# Patient Record
Sex: MALE
[De-identification: names, ages, dates, MRNs, and addresses within clinical notes are randomized per-mention and may not be internally consistent; named-entity substitution may affect disease eponyms.]

---

## 2020-08-14 PROBLEM — Z00.00 ENCOUNTER FOR PREVENTIVE HEALTH EXAMINATION: Status: ACTIVE | Noted: 2020-08-14

## 2021-09-23 ENCOUNTER — APPOINTMENT (OUTPATIENT)
Dept: OTOLARYNGOLOGY | Facility: CLINIC | Age: 51
End: 2021-09-23
Payer: COMMERCIAL

## 2021-09-23 VITALS
SYSTOLIC BLOOD PRESSURE: 115 MMHG | WEIGHT: 160 LBS | HEART RATE: 80 BPM | DIASTOLIC BLOOD PRESSURE: 73 MMHG | BODY MASS INDEX: 22.9 KG/M2 | HEIGHT: 70 IN

## 2021-09-23 DIAGNOSIS — J34.89 OTHER SPECIFIED DISORDERS OF NOSE AND NASAL SINUSES: ICD-10-CM

## 2021-09-23 DIAGNOSIS — Z83.3 FAMILY HISTORY OF DIABETES MELLITUS: ICD-10-CM

## 2021-09-23 DIAGNOSIS — J34.3 HYPERTROPHY OF NASAL TURBINATES: ICD-10-CM

## 2021-09-23 PROCEDURE — 31231 NASAL ENDOSCOPY DX: CPT

## 2021-09-23 PROCEDURE — 99204 OFFICE O/P NEW MOD 45 MIN: CPT | Mod: 25

## 2021-09-23 NOTE — PHYSICAL EXAM
[Nasal Endoscopy Performed] : nasal endoscopy was performed, see procedure section for findings [de-identified] : no skin changes, no palpable fracture [Midline] : trachea located in midline position [Normal] : no rashes

## 2021-09-23 NOTE — HISTORY OF PRESENT ILLNESS
[de-identified] : 51 year old male presents for nasal pain\par Started 1 wk ago-- was localized to nasal bridge, bilateral maxillary areas\par Denies known nasal trauma\par No hx of recurrent sinus infections\par Had no associated sinonasal symptoms\par Denies nasal congestion, anterior rhinorrhea, epistaxis, PND\par Sense of smell is good\par Last sinus infection about 2 years ago\par Denies use of nasal sprays

## 2022-11-21 ENCOUNTER — OFFICE (OUTPATIENT)
Dept: URBAN - METROPOLITAN AREA CLINIC 35 | Facility: CLINIC | Age: 52
Setting detail: OPHTHALMOLOGY
End: 2022-11-21
Payer: COMMERCIAL

## 2022-11-21 DIAGNOSIS — H25.013: ICD-10-CM

## 2022-11-21 DIAGNOSIS — H11.151: ICD-10-CM

## 2022-11-21 DIAGNOSIS — H01.004: ICD-10-CM

## 2022-11-21 DIAGNOSIS — H40.013: ICD-10-CM

## 2022-11-21 DIAGNOSIS — H11.042: ICD-10-CM

## 2022-11-21 DIAGNOSIS — H02.89: ICD-10-CM

## 2022-11-21 DIAGNOSIS — H01.001: ICD-10-CM

## 2022-11-21 PROCEDURE — 92083 EXTENDED VISUAL FIELD XM: CPT | Performed by: OPHTHALMOLOGY

## 2022-11-21 PROCEDURE — 92014 COMPRE OPH EXAM EST PT 1/>: CPT | Performed by: OPHTHALMOLOGY

## 2022-11-21 PROCEDURE — 76514 ECHO EXAM OF EYE THICKNESS: CPT | Performed by: OPHTHALMOLOGY

## 2022-11-21 PROCEDURE — 92133 CPTRZD OPH DX IMG PST SGM ON: CPT | Performed by: OPHTHALMOLOGY

## 2022-11-21 ASSESSMENT — CONFRONTATIONAL VISUAL FIELD TEST (CVF)
OS_FINDINGS: FULL
OD_FINDINGS: FULL

## 2022-11-21 ASSESSMENT — PACHYMETRY
OS_CT_UM: 531
OD_CT_CORRECTION: 1
OS_CT_CORRECTION: 1
OD_CT_UM: 528

## 2022-11-21 ASSESSMENT — REFRACTION_AUTOREFRACTION
OS_CYLINDER: -0.50
OS_AXIS: 079
OD_CYLINDER: -0.25
OD_SPHERE: -0.75
OS_SPHERE: -0.75
OD_AXIS: 094

## 2022-11-21 ASSESSMENT — VISUAL ACUITY
OD_BCVA: 20/20
OS_BCVA: 20/20

## 2022-11-21 ASSESSMENT — SPHEQUIV_DERIVED
OD_SPHEQUIV: -1.125
OD_SPHEQUIV: -0.875
OS_SPHEQUIV: -1.125
OS_SPHEQUIV: -1

## 2022-11-21 ASSESSMENT — REFRACTION_MANIFEST
OS_SPHERE: -1.00
OS_AXIS: 165
OD_CYLINDER: +0.25
OS_CYLINDER: +0.25
OS_CYLINDER: SPHERE
OD_VA1: 20/20
OD_CYLINDER: SPHERE
OD_SPHERE: -1.00
OS_SPHERE: -1.25
OD_AXIS: 004
OD_SPHERE: -1.25
OS_VA1: 20/20

## 2022-11-21 ASSESSMENT — AXIALLENGTH_DERIVED
OS_AL: 25.1873
OS_AL: 25.1319
OD_AL: 24.9797
OD_AL: 24.8709

## 2022-11-21 ASSESSMENT — KERATOMETRY
OS_K2POWER_DIOPTERS: 40.75
OS_K1POWER_DIOPTERS: 40.50
OD_AXISANGLE_DEGREES: 095
OS_AXISANGLE_DEGREES: 076
OD_K1POWER_DIOPTERS: 41.00
OD_K2POWER_DIOPTERS: 41.25

## 2022-11-21 ASSESSMENT — REFRACTION_CURRENTRX
OS_VPRISM_DIRECTION: SV
OS_OVR_VA: 20/
OD_OVR_VA: 20/
OD_SPHERE: -1.00
OS_SPHERE: -1.00
OD_VPRISM_DIRECTION: SV

## 2022-11-21 ASSESSMENT — LID EXAM ASSESSMENTS
OS_MEIBOMITIS: LLL 1+
OD_MEIBOMITIS: RLL 1+
OS_BLEPHARITIS: LUL 1+
OD_BLEPHARITIS: RUL 1+

## 2022-11-21 ASSESSMENT — CORNEAL PTERYGIUM: OS_PTERYGIUM: NASAL 2MM

## 2022-11-21 ASSESSMENT — TONOMETRY
OS_IOP_MMHG: 18
OD_IOP_MMHG: 18

## 2023-03-16 ENCOUNTER — APPOINTMENT (OUTPATIENT)
Dept: ORTHOPEDIC SURGERY | Facility: CLINIC | Age: 53
End: 2023-03-16
Payer: COMMERCIAL

## 2023-03-16 VITALS — HEIGHT: 70 IN | WEIGHT: 155 LBS | BODY MASS INDEX: 22.19 KG/M2

## 2023-03-16 DIAGNOSIS — M79.18 MYALGIA, OTHER SITE: ICD-10-CM

## 2023-03-16 DIAGNOSIS — Z78.9 OTHER SPECIFIED HEALTH STATUS: ICD-10-CM

## 2023-03-16 PROCEDURE — 99204 OFFICE O/P NEW MOD 45 MIN: CPT

## 2023-03-16 PROCEDURE — 73010 X-RAY EXAM OF SHOULDER BLADE: CPT | Mod: RT

## 2023-03-16 PROCEDURE — 73030 X-RAY EXAM OF SHOULDER: CPT | Mod: RT

## 2023-03-16 RX ORDER — METHYLPREDNISOLONE 4 MG/1
4 TABLET ORAL
Qty: 1 | Refills: 0 | Status: ACTIVE | COMMUNITY
Start: 2023-03-16 | End: 1900-01-01

## 2023-03-16 NOTE — IMAGING
[de-identified] : \par RIGHT SHOULDER\par Inspection: No swelling. \par Palpation: Tenderness is noted at the bicipital groove, anterior and lateral. \par Range of motion: There is pain with range of motion.\par , ER 55, @90ER 90, @90IR 30\par Strength: There is pain and discomfort with strength testing.\par Forward Flexion 4/5. Abduction 4/5.  External Rotation 5-/5 and Internal Rotation 5/5 \par Neurological testings: motor and sensor intact distally.\par Ligament Stability and Special Tests: \par There is positive arc of pain. \par Shoulder apprehension: neg\par Shoulder relocation: neg\par Hicks’s test: pos\par Biceps Active test: neg\par Jamison Labral Shear: neg\par Impingement testing: pos\par Orion testing: pos\par Whipple: pos\par Cross Body Adduction: neg\par \par

## 2023-03-16 NOTE — HISTORY OF PRESENT ILLNESS
[de-identified] : 52 year old male  (RHD, wealth management  )  right shoulder pain since 2/2023 after working out\par The pain is located  anterior, lateral \par The pain is associated with  clicking, weakness\par Worse with activity and better at rest.\par Has tried ice, Aleve \par

## 2023-03-16 NOTE — ASSESSMENT
[FreeTextEntry1] : Right X-Ray Examination of the SHOULDER (2 views):  No fractures, subluxations or dislocations. \par X-Ray Examination of the SCAPULA 1 or 2 views shows: No significant abnormalities.  Acromial spur. \par  \par - We discussed their diagnosis and treatment options at length including the risks and benefits of both surgical and non-surgical options.\par - We will continue conservative treatment with a course of PT, icing, and anti-inflammatory medication.\par - The patient was provided with a prescription to work on scapular strengthening and rotator cuff strengthening.\par - The patient was advised to let pain guide the gradual advancement of activities.\par - MDP \par - Discussed the possible side effects of medication along with the timing and frequency for taking.\par - Follow up as needed in 6 weeks to re-evaluate progress with therapy\par

## 2023-08-03 ENCOUNTER — APPOINTMENT (OUTPATIENT)
Dept: ORTHOPEDIC SURGERY | Facility: CLINIC | Age: 53
End: 2023-08-03
Payer: COMMERCIAL

## 2023-08-03 DIAGNOSIS — S43.001A UNSPECIFIED SUBLUXATION OF RIGHT SHOULDER JOINT, INITIAL ENCOUNTER: ICD-10-CM

## 2023-08-03 DIAGNOSIS — G25.89 OTHER SPECIFIED EXTRAPYRAMIDAL AND MOVEMENT DISORDERS: ICD-10-CM

## 2023-08-03 PROCEDURE — 99213 OFFICE O/P EST LOW 20 MIN: CPT

## 2023-08-03 RX ORDER — NAPROXEN 500 MG/1
500 TABLET ORAL TWICE DAILY
Qty: 60 | Refills: 0 | Status: ACTIVE | COMMUNITY
Start: 2023-08-03 | End: 1900-01-01

## 2023-08-03 NOTE — HISTORY OF PRESENT ILLNESS
[de-identified] : 52 year old male  (RHD, wealth management  )  right shoulder pain since 2/2023 after working out The pain is located  anterior, lateral  The pain is associated with  clicking, weakness Worse with activity and better at rest. Has tried ice, Aleve   8/3/23- doing PT , took MDP with temp rleief but s1till pain with OH activtiy

## 2023-08-03 NOTE — ASSESSMENT
[FreeTextEntry1] :  We will obtain an MRI to evaluate the integrity of the rotator cuff tissue and possible need for surgery, given their continued pain, weakness on exam, positive rey testing, and failure to improve with over 6 weeks of conservative treatment.  - The patient was advised of the diagnosis.  The natural history of the pathology was explained to the patient in layman's terms.  Several different treatment options were discussed and explained including the risks and benefits of both surgical and non-surgical treatments. - We will continue conservative treatment with PT, icing, and anti-inflammatory medication. - Naprosyn rx - Patient was given a prescription for an anti-inflammatory medication.  They will take it for the next week and then on an as needed basis, as long as there are no medical contra-indications.  Patient is counseled on possible GI, renal, and cardiovascular side effects. - Follow up after MRI to discuss results and further discuss treatment options. - In the meantime, the patient was advised to apply ice to the area daily, use anti-inflammatory medication, and let pain guide their activities.

## 2023-08-03 NOTE — IMAGING
[de-identified] : RIGHT SHOULDER Inspection: No swelling.  Palpation: Tenderness is noted at the bicipital groove, anterior and lateral.  Range of motion: There is pain with range of motion. , ER 55, @90ER 90, @90IR 30 Strength: There is pain and discomfort with strength testing. Forward Flexion 4/5. Abduction 4/5.  External Rotation 5-/5 and Internal Rotation 5/5  Neurological testings: motor and sensor intact distally. Ligament Stability and Special Tests:  There is positive arc of pain.  Shoulder apprehension: neg Shoulder relocation: neg Hicks's test: pos Biceps Active test: neg Jamison Labral Shear: neg Impingement testing: pos Orion testing: pos Whipple: pos Cross Body Adduction: neg

## 2023-08-12 ENCOUNTER — APPOINTMENT (OUTPATIENT)
Dept: MRI IMAGING | Facility: CLINIC | Age: 53
End: 2023-08-12
Payer: COMMERCIAL

## 2023-08-12 PROCEDURE — 73221 MRI JOINT UPR EXTREM W/O DYE: CPT | Mod: RT

## 2023-08-24 ENCOUNTER — APPOINTMENT (OUTPATIENT)
Dept: ORTHOPEDIC SURGERY | Facility: CLINIC | Age: 53
End: 2023-08-24
Payer: COMMERCIAL

## 2023-08-24 DIAGNOSIS — M75.111 INCOMPLETE ROTATOR CUFF TEAR OR RUPTURE OF RIGHT SHOULDER, NOT SPECIFIED AS TRAUMATIC: ICD-10-CM

## 2023-08-24 DIAGNOSIS — M75.51 BURSITIS OF RIGHT SHOULDER: ICD-10-CM

## 2023-08-24 DIAGNOSIS — M75.41 IMPINGEMENT SYNDROME OF RIGHT SHOULDER: ICD-10-CM

## 2023-08-24 DIAGNOSIS — M75.21 BICIPITAL TENDINITIS, RIGHT SHOULDER: ICD-10-CM

## 2023-08-24 DIAGNOSIS — S43.431A SUPERIOR GLENOID LABRUM LESION OF RIGHT SHOULDER, INITIAL ENCOUNTER: ICD-10-CM

## 2023-08-24 DIAGNOSIS — M71.319 OTHER BURSAL CYST, UNSPECIFIED SHOULDER: ICD-10-CM

## 2023-08-24 PROCEDURE — 99214 OFFICE O/P EST MOD 30 MIN: CPT

## 2023-08-24 NOTE — DISCUSSION/SUMMARY
[de-identified] : The patient was advised of the diagnosis. The natural history of the pathology was explained in full to the patient in layman's terms. Several different treatment options were discussed and explained to the patient including the risks and benefits of both surgical and non-surgical treatments.   I do think they are a candidate for surgery, given their pain, weakness, and failure to improve with previous non-operative treatment. We discussed that the goal of surgery is pain relief along with improved function.   The risks, benefits, and alternatives to surgical arthroscopy of the right shoulder with rotator cuff repair vs debridement, subacromial decompression, glenohumeral joint debridement, possible labral repair cyst ecsiion, and possible biceps tenotomy vs. tenodesis were reviewed with the patient. Risks of surgery were outlined in full to the patient including but not limited to pain, infection (superficial or deep), bleeding, vascular injury, numbness, tingling, nerve damage (direct or indirect), scarring, stiffness  (including the possible development of post op adhesive capsulitis), non-healing, wound breakdown, failure to resolve symptoms, symptom recurrence, the need for further surgery, as well as medical complications such as blood clots, pulmonary embolism, heart attack, stroke, and other anesthesia complications including even death. The patient understood and accepted these risks and that other complications not mentioned above could occur.   They understand that 100% recovery is not assured and the desired level of function may not be achievable. We discussed the potential for a prolonged recovery course and the potential for this to affect their activities.  The intraoperative plan, post-operative plan, post-operative expectations and limitations were explained in full. The importance of postoperative rehabilitation and restriction compliance was emphasized. Expectations from non-surgical treatment were explained in full as well. The patient demonstrated a complete understanding of the treatment alternatives and requested to proceed with surgery. This will be scheduled accordingly.

## 2023-08-24 NOTE — IMAGING
[de-identified] : RIGHT SHOULDER Inspection: No swelling.  Palpation: Tenderness is noted at the bicipital groove, anterior and lateral.  Range of motion: There is pain with range of motion. , ER 55, @90ER 90, @90IR 30 Strength: There is pain and discomfort with strength testing. Forward Flexion 4/5. Abduction 4/5.  External Rotation 5-/5 and Internal Rotation 5/5  Neurological testings: motor and sensor intact distally. Ligament Stability and Special Tests:  There is positive arc of pain.  Shoulder apprehension: neg Shoulder relocation: neg Hicks's test: pos Biceps Active test: pos Jamison Labral Shear: pos Impingement testing: pos Orion testing: pos Whipple: pos Cross Body Adduction: neg

## 2023-08-24 NOTE — HISTORY OF PRESENT ILLNESS
[de-identified] : 52 year old male  (RHD, wealth management  )  right shoulder pain since 2/2023 after working out The pain is located  anterior, lateral  The pain is associated with  clicking, weakness Worse with activity and better at rest. Has tried ice, Aleve   8/3/23- doing PT , took MDP with temp rleief but s1till pain with OH activtiy 8/24/23 - cont pain and weakness, had mri showing RTC tearing and labral tearing with cyst

## 2023-08-24 NOTE — ASSESSMENT
[FreeTextEntry1] : mri right shoulder 8/12/23 - partial tear supra and subscap, post-sup labral tearing, post-sup paralabral cyst with ext to notch, bursitis, bicep tendonitis  - We discussed their diagnosis and treatment options at length including the risks and benefits of both surgical and non-surgical options. - Given the fact that they continue to have pain, weakness, and have failed conservative treatment including icing, anti-inflammatory medications, injection, and PT, they are a surgical candidate. - The risks, benefits, and alternatives to right /shoulder surgical arthroscopy with rotator cuff repair vs debridement, SAD, GHD, possible labral repair, cyst excsiion, and poss biceps tenotomy vs. tenodesis were discussed with the patient, all questions were answered.

## 2023-10-05 ENCOUNTER — OFFICE (OUTPATIENT)
Dept: URBAN - METROPOLITAN AREA CLINIC 35 | Facility: CLINIC | Age: 53
Setting detail: OPHTHALMOLOGY
End: 2023-10-05
Payer: COMMERCIAL

## 2023-10-05 DIAGNOSIS — H52.13: ICD-10-CM

## 2023-10-05 DIAGNOSIS — H11.042: ICD-10-CM

## 2023-10-05 DIAGNOSIS — H40.013: ICD-10-CM

## 2023-10-05 PROCEDURE — 92015 DETERMINE REFRACTIVE STATE: CPT | Performed by: OPHTHALMOLOGY

## 2023-10-05 PROCEDURE — 99213 OFFICE O/P EST LOW 20 MIN: CPT | Performed by: OPHTHALMOLOGY

## 2023-10-05 ASSESSMENT — REFRACTION_MANIFEST
OD_SPHERE: -1.00
OS_SPHERE: -1.00
OS_CYLINDER: +0.25
OD_CYLINDER: +0.25
OD_CYLINDER: -0.25
OS_AXIS: 165
OD_SPHERE: -0.75
OD_VA1: 20/20
OD_AXIS: 004
OS_AXIS: 088
OD_VA1: 20/15
OS_VA1: 20/20
OS_SPHERE: -1.25
OD_AXIS: 085
OD_CYLINDER: SPHERE
OD_SPHERE: -1.25
OS_SPHERE: -0.75
OS_CYLINDER: SPHERE
OS_VA1: 20/15
OS_CYLINDER: -0.25

## 2023-10-05 ASSESSMENT — REFRACTION_CURRENTRX
OD_OVR_VA: 20/
OS_VPRISM_DIRECTION: SV
OD_VPRISM_DIRECTION: SV
OS_OVR_VA: 20/
OD_SPHERE: -1.00
OS_SPHERE: -1.00

## 2023-10-05 ASSESSMENT — AXIALLENGTH_DERIVED
OS_AL: 25.0767
OS_AL: 25.1319
OD_AL: 24.8709
OD_AL: 24.8709
OD_AL: 24.9797
OS_AL: 25.1873

## 2023-10-05 ASSESSMENT — TONOMETRY
OD_IOP_MMHG: 17
OS_IOP_MMHG: 17

## 2023-10-05 ASSESSMENT — KERATOMETRY
OS_K1POWER_DIOPTERS: 40.50
OD_K1POWER_DIOPTERS: 41.00
OD_AXISANGLE_DEGREES: 095
OD_K2POWER_DIOPTERS: 41.25
OS_K2POWER_DIOPTERS: 40.75
OS_AXISANGLE_DEGREES: 076

## 2023-10-05 ASSESSMENT — PACHYMETRY
OD_CT_UM: 528
OD_CT_CORRECTION: 1
OS_CT_UM: 531
OS_CT_CORRECTION: 1

## 2023-10-05 ASSESSMENT — SPHEQUIV_DERIVED
OD_SPHEQUIV: -0.875
OS_SPHEQUIV: -1
OD_SPHEQUIV: -1.125
OS_SPHEQUIV: -1.125
OD_SPHEQUIV: -0.875
OS_SPHEQUIV: -0.875

## 2023-10-05 ASSESSMENT — REFRACTION_AUTOREFRACTION
OD_AXIS: 083
OD_SPHERE: -0.75
OS_SPHERE: -0.75
OS_AXIS: 088
OD_CYLINDER: -0.25
OS_CYLINDER: -0.50

## 2023-10-05 ASSESSMENT — LID EXAM ASSESSMENTS
OS_BLEPHARITIS: LUL 1+
OD_BLEPHARITIS: RUL 1+
OD_MEIBOMITIS: RLL 1+
OS_MEIBOMITIS: LLL 1+

## 2023-10-05 ASSESSMENT — VISUAL ACUITY
OD_BCVA: 20/40+2
OS_BCVA: 20/25-2

## 2023-10-05 ASSESSMENT — CORNEAL PTERYGIUM: OS_PTERYGIUM: NASAL 2MM

## 2024-07-03 ENCOUNTER — OFFICE (OUTPATIENT)
Dept: URBAN - METROPOLITAN AREA CLINIC 35 | Facility: CLINIC | Age: 54
Setting detail: OPHTHALMOLOGY
End: 2024-07-03
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
## 2024-07-03 PROCEDURE — NO SHOW FE NO SHOW FEE: Performed by: OPHTHALMOLOGY

## 2024-08-26 ENCOUNTER — RX ONLY (RX ONLY)
Age: 54
End: 2024-08-26

## 2024-08-26 ENCOUNTER — DOCTOR'S OFFICE (OUTPATIENT)
Age: 54
Setting detail: OPHTHALMOLOGY
End: 2024-08-26
Payer: COMMERCIAL

## 2024-08-26 DIAGNOSIS — H00.14: ICD-10-CM

## 2024-08-26 PROCEDURE — 92012 INTRM OPH EXAM EST PATIENT: CPT | Performed by: OPHTHALMOLOGY

## 2024-08-26 ASSESSMENT — CONFRONTATIONAL VISUAL FIELD TEST (CVF)
OS_FINDINGS: FULL
OD_FINDINGS: FULL

## 2024-08-26 ASSESSMENT — LID EXAM ASSESSMENTS
OD_MEIBOMITIS: RLL 1+
OS_MEIBOMITIS: LLL 1+
OD_BLEPHARITIS: RUL 1+
OS_BLEPHARITIS: LUL 1+

## 2024-10-03 ENCOUNTER — DOCTOR'S OFFICE (OUTPATIENT)
Facility: LOCATION | Age: 54
Setting detail: OPHTHALMOLOGY
End: 2024-10-03
Payer: COMMERCIAL

## 2024-10-03 ENCOUNTER — RX ONLY (RX ONLY)
Age: 54
End: 2024-10-03

## 2024-10-03 DIAGNOSIS — H00.14: ICD-10-CM

## 2024-10-03 DIAGNOSIS — H40.013: ICD-10-CM

## 2024-10-03 PROCEDURE — 99213 OFFICE O/P EST LOW 20 MIN: CPT | Performed by: OPHTHALMOLOGY

## 2024-10-03 ASSESSMENT — PACHYMETRY
OS_CT_UM: 531
OS_CT_CORRECTION: 1
OD_CT_CORRECTION: 1
OD_CT_UM: 528

## 2024-10-03 ASSESSMENT — REFRACTION_MANIFEST
OD_SPHERE: -0.75
OD_SPHERE: -1.00
OS_CYLINDER: -0.25
OS_AXIS: 165
OD_CYLINDER: -0.25
OS_SPHERE: -1.00
OS_SPHERE: -0.75
OD_AXIS: 004
OS_VA1: 20/15
OS_SPHERE: -1.00
OD_AXIS: 085
OS_AXIS: 088
OS_CYLINDER: +0.25
OD_SPHERE: -1.25
OD_CYLINDER: -0.25
OD_VA1: 20/20
OD_CYLINDER: +0.25
OS_CYLINDER: -0.25
OS_VA1: 20/20
OS_AXIS: 088
OS_CYLINDER: SPHERE
OS_VA1: 20/20
OD_AXIS: 085
OD_VA1: 20/20
OD_CYLINDER: SPHERE
OS_SPHERE: -1.25
OD_VA1: 20/15
OD_SPHERE: -1.00

## 2024-10-03 ASSESSMENT — CONFRONTATIONAL VISUAL FIELD TEST (CVF)
OD_FINDINGS: FULL
OS_FINDINGS: FULL

## 2024-10-03 ASSESSMENT — LID EXAM ASSESSMENTS
OS_MEIBOMITIS: LLL 1+
OS_BLEPHARITIS: LUL 1+
OD_MEIBOMITIS: RLL 1+
OD_BLEPHARITIS: RUL 1+

## 2024-10-03 ASSESSMENT — VISUAL ACUITY
OS_BCVA: 20/20
OD_BCVA: 20/20

## 2024-10-03 ASSESSMENT — REFRACTION_CURRENTRX
OD_SPHERE: -1.00
OS_VPRISM_DIRECTION: SV
OD_OVR_VA: 20/
OD_VPRISM_DIRECTION: SV
OS_OVR_VA: 20/
OS_SPHERE: -1.00

## 2024-10-03 ASSESSMENT — KERATOMETRY: METHOD_AUTO_MANUAL: AUTO

## 2024-10-03 ASSESSMENT — TONOMETRY
OS_IOP_MMHG: 14
OD_IOP_MMHG: 14

## 2024-10-03 ASSESSMENT — CORNEAL PTERYGIUM: OS_PTERYGIUM: NASAL 2MM

## 2024-12-06 ENCOUNTER — DOCTOR'S OFFICE (OUTPATIENT)
Facility: LOCATION | Age: 54
Setting detail: OPHTHALMOLOGY
End: 2024-12-06
Payer: COMMERCIAL

## 2024-12-06 DIAGNOSIS — H11.042: ICD-10-CM

## 2024-12-06 DIAGNOSIS — H01.001: ICD-10-CM

## 2024-12-06 DIAGNOSIS — H25.013: ICD-10-CM

## 2024-12-06 DIAGNOSIS — H40.013: ICD-10-CM

## 2024-12-06 DIAGNOSIS — H01.004: ICD-10-CM

## 2024-12-06 DIAGNOSIS — H11.151: ICD-10-CM

## 2024-12-06 DIAGNOSIS — H52.13: ICD-10-CM

## 2024-12-06 DIAGNOSIS — H02.89: ICD-10-CM

## 2024-12-06 PROCEDURE — 92133 CPTRZD OPH DX IMG PST SGM ON: CPT | Performed by: OPHTHALMOLOGY

## 2024-12-06 PROCEDURE — 92083 EXTENDED VISUAL FIELD XM: CPT | Performed by: OPHTHALMOLOGY

## 2024-12-06 PROCEDURE — 92014 COMPRE OPH EXAM EST PT 1/>: CPT | Performed by: OPHTHALMOLOGY

## 2024-12-06 ASSESSMENT — REFRACTION_MANIFEST
OS_CYLINDER: -0.25
OS_CYLINDER: SPHERE
OD_VA1: 20/20
OD_CYLINDER: SPHERE
OD_SPHERE: -0.75
OS_VA1: 20/20
OS_VA1: 20/15
OD_AXIS: 085
OS_VA1: 20/20
OS_CYLINDER: -0.25
OD_SPHERE: -1.00
OD_VA1: 20/20
OD_AXIS: 085
OD_CYLINDER: +0.25
OD_CYLINDER: -0.25
OS_AXIS: 088
OD_VA1: 20/15
OS_AXIS: 088
OD_AXIS: 004
OD_SPHERE: -1.25
OS_SPHERE: -1.00
OS_CYLINDER: +0.25
OD_CYLINDER: -0.25
OS_SPHERE: -0.75
OD_SPHERE: -1.00
OS_SPHERE: -1.00
OS_AXIS: 165
OS_SPHERE: -1.25

## 2024-12-06 ASSESSMENT — VISUAL ACUITY
OD_BCVA: 20/20
OS_BCVA: 20/25-

## 2024-12-06 ASSESSMENT — PACHYMETRY
OD_CT_UM: 528
OS_CT_UM: 531
OD_CT_CORRECTION: 1
OS_CT_CORRECTION: 1

## 2024-12-06 ASSESSMENT — LID EXAM ASSESSMENTS
OS_MEIBOMITIS: LLL 1+
OD_MEIBOMITIS: RLL 1+
OS_BLEPHARITIS: LUL 1+
OD_BLEPHARITIS: RUL 1+

## 2024-12-06 ASSESSMENT — KERATOMETRY
OS_AXISANGLE_DEGREES: 018
OD_K1POWER_DIOPTERS: 40.75
OS_K1POWER_DIOPTERS: 40.50
OS_K2POWER_DIOPTERS: 40.75
METHOD_AUTO_MANUAL: AUTO
OD_K2POWER_DIOPTERS: 41.00
OD_AXISANGLE_DEGREES: 099

## 2024-12-06 ASSESSMENT — REFRACTION_AUTOREFRACTION
OD_SPHERE: -0.50
OD_AXIS: 080
OD_CYLINDER: -0.50
OS_SPHERE: -0.75
OS_AXIS: 090
OS_CYLINDER: -0.75

## 2024-12-06 ASSESSMENT — REFRACTION_CURRENTRX
OD_OVR_VA: 20/
OS_VPRISM_DIRECTION: SV
OD_OVR_VA: 20/
OS_OVR_VA: 20/
OD_SPHERE: -1.00
OD_VPRISM_DIRECTION: SV
OD_CYLINDER: SPH
OS_OVR_VA: 20/
OD_VPRISM_DIRECTION: SV
OD_SPHERE: -1.00
OS_VPRISM_DIRECTION: SV
OS_CYLINDER: SPH
OS_SPHERE: -1.00
OS_SPHERE: -1.00

## 2024-12-06 ASSESSMENT — CONFRONTATIONAL VISUAL FIELD TEST (CVF)
OS_FINDINGS: FULL
OD_FINDINGS: FULL

## 2024-12-06 ASSESSMENT — TONOMETRY
OS_IOP_MMHG: 16
OD_IOP_MMHG: 17

## 2024-12-06 ASSESSMENT — CORNEAL PTERYGIUM: OS_PTERYGIUM: NASAL 2MM
